# Patient Record
(demographics unavailable — no encounter records)

---

## 2024-12-16 NOTE — ASSESSMENT
[FreeTextEntry1] : Discussion with the patient regarding colon cancer in the [place].  Risk factors for colon cancer include age, ethnicity, having comorbidities such as obesity, DM, cardiac disease, having nicotine addiction, alcohol addiction, having a family hx of colon cancer, cancer syndromes, personal hx of inflammatory disease etc.   Patient is average risk for colon cancer.   Screening tests available for colon cancer prevention. Screening tests include stool test, CT scans such as CT colonography, and a colonoscopy. Patient was made aware that despite these screening test, a cancerous lesion can still be missed. The gold standard test is a screening colonoscopy.  I will therefore plan for a colonoscopy to rule out colon polyps, colorectal cancer etc. under monitored anesthesia care. Risks such as perforation requiring surgery, bleeding, infection, diverticulitis, colitis, missed colon cancer (2% to 6%), internal organ injury, etc, risks of bowel prep including colitis, syncope, adverse reaction to medication etc. and risks of anesthesia including cardiopulmonary compromise were discussed with patient. Patient verbalized understanding and agrees to proceed with the planned procedure.

## 2024-12-16 NOTE — CONSULT LETTER
[Dear  ___] : Dear  [unfilled], [Sincerely,] : Sincerely, [FreeTextEntry1] : Olga is a pleasant 53 year old F without any major medical problems who is referred for a screening colonoscopy. Patient is average risk for colon cancer.   Screening tests available for colon cancer prevention. Screening tests include stool test, CT scans such as CT colonography, and a colonoscopy. Patient was made aware that despite these screening test, a cancerous lesion can still be missed. The gold standard test is a screening colonoscopy.  I will plan for a colonoscopy to rule out colon polyps, colorectal cancer etc. under monitored anesthesia care. Risks such as perforation requiring surgery, bleeding, infection, diverticulitis, colitis, missed colon cancer (2% to 6%), internal organ injury, etc, risks of bowel prep including colitis, syncope, adverse reaction to medication etc. and risks of anesthesia including cardiopulmonary compromise were discussed with patient. Patient verbalized understanding and agrees to proceed with the planned procedure. [FreeTextEntry3] :  Chantel Rich MD Gastroenterology, Hepatology and Motility   [DrPedrito  ___] : Dr. MCCULLOUGH

## 2024-12-16 NOTE — HISTORY OF PRESENT ILLNESS
[FreeTextEntry1] : Olga is a pleasant 53 year old F without any major medical problems who is referred for a screening colonoscopy.  currently on antibiotics (Zm) for URI and finished a course of Prednisone for RAD, recently traveled back from [place]. typically not on any meds except MVI. no prior abdominal surgeries. one CS. no GI complaints.